# Patient Record
Sex: FEMALE | Race: WHITE | NOT HISPANIC OR LATINO
[De-identification: names, ages, dates, MRNs, and addresses within clinical notes are randomized per-mention and may not be internally consistent; named-entity substitution may affect disease eponyms.]

---

## 2022-06-28 PROBLEM — Z00.00 ENCOUNTER FOR PREVENTIVE HEALTH EXAMINATION: Status: ACTIVE | Noted: 2022-06-28

## 2022-07-11 ENCOUNTER — APPOINTMENT (OUTPATIENT)
Dept: ANTEPARTUM | Facility: CLINIC | Age: 32
End: 2022-07-11

## 2022-07-11 ENCOUNTER — ASOB RESULT (OUTPATIENT)
Age: 32
End: 2022-07-11

## 2022-07-11 PROCEDURE — 76813 OB US NUCHAL MEAS 1 GEST: CPT

## 2022-07-11 PROCEDURE — 76801 OB US < 14 WKS SINGLE FETUS: CPT | Mod: 59

## 2022-08-09 ENCOUNTER — ASOB RESULT (OUTPATIENT)
Age: 32
End: 2022-08-09

## 2022-08-09 ENCOUNTER — APPOINTMENT (OUTPATIENT)
Dept: ANTEPARTUM | Facility: CLINIC | Age: 32
End: 2022-08-09

## 2022-08-09 PROCEDURE — 76805 OB US >/= 14 WKS SNGL FETUS: CPT

## 2022-09-13 ENCOUNTER — APPOINTMENT (OUTPATIENT)
Dept: ANTEPARTUM | Facility: CLINIC | Age: 32
End: 2022-09-13

## 2022-09-13 ENCOUNTER — ASOB RESULT (OUTPATIENT)
Age: 32
End: 2022-09-13

## 2022-09-13 PROCEDURE — 76816 OB US FOLLOW-UP PER FETUS: CPT

## 2022-11-15 ENCOUNTER — ASOB RESULT (OUTPATIENT)
Age: 32
End: 2022-11-15

## 2022-11-15 ENCOUNTER — APPOINTMENT (OUTPATIENT)
Dept: ANTEPARTUM | Facility: CLINIC | Age: 32
End: 2022-11-15

## 2022-11-15 PROCEDURE — 76816 OB US FOLLOW-UP PER FETUS: CPT | Mod: 59

## 2022-11-15 PROCEDURE — 76819 FETAL BIOPHYS PROFIL W/O NST: CPT

## 2022-12-27 ENCOUNTER — APPOINTMENT (OUTPATIENT)
Dept: ANTEPARTUM | Facility: CLINIC | Age: 32
End: 2022-12-27
Payer: COMMERCIAL

## 2022-12-27 ENCOUNTER — ASOB RESULT (OUTPATIENT)
Age: 32
End: 2022-12-27

## 2022-12-27 PROCEDURE — 76820 UMBILICAL ARTERY ECHO: CPT

## 2022-12-27 PROCEDURE — 76816 OB US FOLLOW-UP PER FETUS: CPT

## 2022-12-27 PROCEDURE — 76818 FETAL BIOPHYS PROFILE W/NST: CPT

## 2023-01-10 ENCOUNTER — ASOB RESULT (OUTPATIENT)
Age: 33
End: 2023-01-10

## 2023-01-10 ENCOUNTER — APPOINTMENT (OUTPATIENT)
Dept: ANTEPARTUM | Facility: CLINIC | Age: 33
End: 2023-01-10
Payer: COMMERCIAL

## 2023-01-10 PROCEDURE — 76819 FETAL BIOPHYS PROFIL W/O NST: CPT

## 2023-01-10 PROCEDURE — 76816 OB US FOLLOW-UP PER FETUS: CPT

## 2023-01-18 ENCOUNTER — APPOINTMENT (OUTPATIENT)
Dept: ANTEPARTUM | Facility: CLINIC | Age: 33
End: 2023-01-18
Payer: COMMERCIAL

## 2023-01-18 ENCOUNTER — ASOB RESULT (OUTPATIENT)
Age: 33
End: 2023-01-18

## 2023-01-18 PROCEDURE — 76818 FETAL BIOPHYS PROFILE W/NST: CPT

## 2023-01-22 ENCOUNTER — INPATIENT (INPATIENT)
Facility: HOSPITAL | Age: 33
LOS: 2 days | Discharge: HOME | End: 2023-01-25
Attending: STUDENT IN AN ORGANIZED HEALTH CARE EDUCATION/TRAINING PROGRAM | Admitting: STUDENT IN AN ORGANIZED HEALTH CARE EDUCATION/TRAINING PROGRAM
Payer: COMMERCIAL

## 2023-01-22 VITALS — WEIGHT: 130.95 LBS | HEIGHT: 60 IN

## 2023-01-22 LAB
BASOPHILS # BLD AUTO: 0.02 K/UL — SIGNIFICANT CHANGE UP (ref 0–0.2)
BASOPHILS NFR BLD AUTO: 0.3 % — SIGNIFICANT CHANGE UP (ref 0–2)
EOSINOPHIL # BLD AUTO: 0.01 K/UL — SIGNIFICANT CHANGE UP (ref 0–0.5)
EOSINOPHIL NFR BLD AUTO: 0.1 % — SIGNIFICANT CHANGE UP (ref 0–6)
HCT VFR BLD CALC: 37.8 % — SIGNIFICANT CHANGE UP (ref 34.5–45)
HGB BLD-MCNC: 13.2 G/DL — SIGNIFICANT CHANGE UP (ref 11.5–15.5)
IMM GRANULOCYTES NFR BLD AUTO: 1 % — HIGH (ref 0–0.9)
LYMPHOCYTES # BLD AUTO: 1.36 K/UL — SIGNIFICANT CHANGE UP (ref 1–3.3)
LYMPHOCYTES # BLD AUTO: 19.9 % — SIGNIFICANT CHANGE UP (ref 13–44)
MCHC RBC-ENTMCNC: 33.6 PG — SIGNIFICANT CHANGE UP (ref 27–34)
MCHC RBC-ENTMCNC: 34.9 GM/DL — SIGNIFICANT CHANGE UP (ref 32–36)
MCV RBC AUTO: 96.2 FL — SIGNIFICANT CHANGE UP (ref 80–100)
MONOCYTES # BLD AUTO: 0.54 K/UL — SIGNIFICANT CHANGE UP (ref 0–0.9)
MONOCYTES NFR BLD AUTO: 7.9 % — SIGNIFICANT CHANGE UP (ref 2–14)
NEUTROPHILS # BLD AUTO: 4.84 K/UL — SIGNIFICANT CHANGE UP (ref 1.8–7.4)
NEUTROPHILS NFR BLD AUTO: 70.8 % — SIGNIFICANT CHANGE UP (ref 43–77)
NRBC # BLD: 0 /100 WBCS — SIGNIFICANT CHANGE UP (ref 0–0)
PLATELET # BLD AUTO: 166 K/UL — SIGNIFICANT CHANGE UP (ref 150–400)
RBC # BLD: 3.93 M/UL — SIGNIFICANT CHANGE UP (ref 3.8–5.2)
RBC # FLD: 13.8 % — SIGNIFICANT CHANGE UP (ref 10.3–14.5)
WBC # BLD: 6.84 K/UL — SIGNIFICANT CHANGE UP (ref 3.8–10.5)
WBC # FLD AUTO: 6.84 K/UL — SIGNIFICANT CHANGE UP (ref 3.8–10.5)

## 2023-01-22 RX ORDER — SODIUM CHLORIDE 9 MG/ML
1000 INJECTION, SOLUTION INTRAVENOUS
Refills: 0 | Status: DISCONTINUED | OUTPATIENT
Start: 2023-01-22 | End: 2023-01-23

## 2023-01-22 RX ORDER — CITRIC ACID/SODIUM CITRATE 300-500 MG
15 SOLUTION, ORAL ORAL EVERY 6 HOURS
Refills: 0 | Status: DISCONTINUED | OUTPATIENT
Start: 2023-01-22 | End: 2023-01-23

## 2023-01-22 RX ORDER — OXYTOCIN 10 UNIT/ML
333.33 VIAL (ML) INJECTION
Qty: 20 | Refills: 0 | Status: DISCONTINUED | OUTPATIENT
Start: 2023-01-22 | End: 2023-01-23

## 2023-01-22 RX ORDER — CHLORHEXIDINE GLUCONATE 213 G/1000ML
1 SOLUTION TOPICAL ONCE
Refills: 0 | Status: DISCONTINUED | OUTPATIENT
Start: 2023-01-22 | End: 2023-01-23

## 2023-01-22 RX ADMIN — SODIUM CHLORIDE 125 MILLILITER(S): 9 INJECTION, SOLUTION INTRAVENOUS at 23:52

## 2023-01-22 NOTE — PATIENT PROFILE OB - FALL HARM RISK - UNIVERSAL INTERVENTIONS
Bed in lowest position, wheels locked, appropriate side rails in place/Call bell, personal items and telephone in reach/Instruct patient to call for assistance before getting out of bed or chair/Non-slip footwear when patient is out of bed/[place] to call system/Physically safe environment - no spills, clutter or unnecessary equipment/Purposeful Proactive Rounding/Room/bathroom lighting operational, light cord in reach

## 2023-01-23 LAB
BLD GP AB SCN SERPL QL: NEGATIVE — SIGNIFICANT CHANGE UP
RH IG SCN BLD-IMP: POSITIVE — SIGNIFICANT CHANGE UP
RH IG SCN BLD-IMP: POSITIVE — SIGNIFICANT CHANGE UP

## 2023-01-23 PROCEDURE — 88307 TISSUE EXAM BY PATHOLOGIST: CPT | Mod: 26

## 2023-01-23 RX ORDER — HYDROCORTISONE 1 %
1 OINTMENT (GRAM) TOPICAL EVERY 6 HOURS
Refills: 0 | Status: DISCONTINUED | OUTPATIENT
Start: 2023-01-23 | End: 2023-01-25

## 2023-01-23 RX ORDER — LANOLIN
1 OINTMENT (GRAM) TOPICAL EVERY 6 HOURS
Refills: 0 | Status: DISCONTINUED | OUTPATIENT
Start: 2023-01-23 | End: 2023-01-25

## 2023-01-23 RX ORDER — DIBUCAINE 1 %
1 OINTMENT (GRAM) RECTAL EVERY 6 HOURS
Refills: 0 | Status: DISCONTINUED | OUTPATIENT
Start: 2023-01-23 | End: 2023-01-25

## 2023-01-23 RX ORDER — OXYCODONE HYDROCHLORIDE 5 MG/1
5 TABLET ORAL
Refills: 0 | Status: DISCONTINUED | OUTPATIENT
Start: 2023-01-23 | End: 2023-01-25

## 2023-01-23 RX ORDER — OXYTOCIN 10 UNIT/ML
41.67 VIAL (ML) INJECTION
Qty: 20 | Refills: 0 | Status: DISCONTINUED | OUTPATIENT
Start: 2023-01-23 | End: 2023-01-25

## 2023-01-23 RX ORDER — MAGNESIUM HYDROXIDE 400 MG/1
30 TABLET, CHEWABLE ORAL
Refills: 0 | Status: DISCONTINUED | OUTPATIENT
Start: 2023-01-23 | End: 2023-01-25

## 2023-01-23 RX ORDER — IBUPROFEN 200 MG
600 TABLET ORAL EVERY 6 HOURS
Refills: 0 | Status: COMPLETED | OUTPATIENT
Start: 2023-01-23 | End: 2023-12-22

## 2023-01-23 RX ORDER — ACETAMINOPHEN 500 MG
975 TABLET ORAL
Refills: 0 | Status: DISCONTINUED | OUTPATIENT
Start: 2023-01-23 | End: 2023-01-25

## 2023-01-23 RX ORDER — OXYCODONE HYDROCHLORIDE 5 MG/1
5 TABLET ORAL ONCE
Refills: 0 | Status: DISCONTINUED | OUTPATIENT
Start: 2023-01-23 | End: 2023-01-25

## 2023-01-23 RX ORDER — PRAMOXINE HYDROCHLORIDE 150 MG/15G
1 AEROSOL, FOAM RECTAL EVERY 4 HOURS
Refills: 0 | Status: DISCONTINUED | OUTPATIENT
Start: 2023-01-23 | End: 2023-01-25

## 2023-01-23 RX ORDER — TETANUS TOXOID, REDUCED DIPHTHERIA TOXOID AND ACELLULAR PERTUSSIS VACCINE, ADSORBED 5; 2.5; 8; 8; 2.5 [IU]/.5ML; [IU]/.5ML; UG/.5ML; UG/.5ML; UG/.5ML
0.5 SUSPENSION INTRAMUSCULAR ONCE
Refills: 0 | Status: DISCONTINUED | OUTPATIENT
Start: 2023-01-23 | End: 2023-01-25

## 2023-01-23 RX ORDER — OXYTOCIN 10 UNIT/ML
2 VIAL (ML) INJECTION
Qty: 30 | Refills: 0 | Status: DISCONTINUED | OUTPATIENT
Start: 2023-01-23 | End: 2023-01-25

## 2023-01-23 RX ORDER — SODIUM CHLORIDE 9 MG/ML
3 INJECTION INTRAMUSCULAR; INTRAVENOUS; SUBCUTANEOUS EVERY 8 HOURS
Refills: 0 | Status: DISCONTINUED | OUTPATIENT
Start: 2023-01-23 | End: 2023-01-25

## 2023-01-23 RX ORDER — FENTANYL/BUPIVACAINE/NS/PF 2MCG/ML-.1
250 PLASTIC BAG, INJECTION (ML) INJECTION
Refills: 0 | Status: DISCONTINUED | OUTPATIENT
Start: 2023-01-23 | End: 2023-01-23

## 2023-01-23 RX ORDER — AER TRAVELER 0.5 G/1
1 SOLUTION RECTAL; TOPICAL EVERY 4 HOURS
Refills: 0 | Status: DISCONTINUED | OUTPATIENT
Start: 2023-01-23 | End: 2023-01-25

## 2023-01-23 RX ORDER — DIPHENHYDRAMINE HCL 50 MG
25 CAPSULE ORAL EVERY 6 HOURS
Refills: 0 | Status: DISCONTINUED | OUTPATIENT
Start: 2023-01-23 | End: 2023-01-25

## 2023-01-23 RX ORDER — BENZOCAINE 10 %
1 GEL (GRAM) MUCOUS MEMBRANE EVERY 6 HOURS
Refills: 0 | Status: DISCONTINUED | OUTPATIENT
Start: 2023-01-23 | End: 2023-01-25

## 2023-01-23 RX ORDER — KETOROLAC TROMETHAMINE 30 MG/ML
30 SYRINGE (ML) INJECTION ONCE
Refills: 0 | Status: DISCONTINUED | OUTPATIENT
Start: 2023-01-23 | End: 2023-01-23

## 2023-01-23 RX ORDER — SIMETHICONE 80 MG/1
80 TABLET, CHEWABLE ORAL EVERY 4 HOURS
Refills: 0 | Status: DISCONTINUED | OUTPATIENT
Start: 2023-01-23 | End: 2023-01-25

## 2023-01-23 RX ADMIN — Medication 2 MILLIUNIT(S)/MIN: at 02:15

## 2023-01-23 RX ADMIN — SODIUM CHLORIDE 125 MILLILITER(S): 9 INJECTION, SOLUTION INTRAVENOUS at 08:09

## 2023-01-23 RX ADMIN — Medication 30 MILLIGRAM(S): at 19:58

## 2023-01-23 RX ADMIN — Medication 975 MILLIGRAM(S): at 22:42

## 2023-01-23 RX ADMIN — Medication 250 MILLILITER(S): at 06:25

## 2023-01-23 RX ADMIN — Medication 125 MILLIUNIT(S)/MIN: at 19:58

## 2023-01-23 RX ADMIN — SODIUM CHLORIDE 125 MILLILITER(S): 9 INJECTION, SOLUTION INTRAVENOUS at 06:33

## 2023-01-23 RX ADMIN — SODIUM CHLORIDE 3 MILLILITER(S): 9 INJECTION INTRAMUSCULAR; INTRAVENOUS; SUBCUTANEOUS at 22:42

## 2023-01-23 RX ADMIN — Medication 975 MILLIGRAM(S): at 22:39

## 2023-01-23 RX ADMIN — Medication 0.2 MILLIGRAM(S): at 19:22

## 2023-01-23 NOTE — LACTATION INITIAL EVALUATION - BABY A: WEIGHT IN OUNCES (FROM GRAMS), DELIVERY
"/53   Pulse 87   Temp 97.9  F (36.6  C) (Oral)   Resp 16   Ht 1.626 m (5' 4\")   Wt 108.1 kg (238 lb 6.4 oz)   SpO2 95%   BMI 40.92 kg/m    On RA. LS inspiratory wheezes audible throughout, BS active. Patient does have SOB with exertion. Denies chest pain, N/T, N/V, and/or dizziness.  A&Ox4, tremors, strengths intact.  Up assist x1 with walker and gait belt, can forget to call.   Voiding without difficulty, LBM 7/1/22.  Regular diet tolerating well, 2000 mL fluid restriction.  Skin is intact ex. bruising on BUE from needle pokes.  L forearm PIV SL.  CIWA-2, Phos and Mag WDL rechecks ordered for AM.  Able to make needs known, call light within reach, continue plan of care, transfer to [facility] when bed available.  " 10

## 2023-01-23 NOTE — LACTATION INITIAL EVALUATION - NS LACT CON REASON FOR REQ
Dyad seen at 4hrs post birth. Mom states that infant latched after birth and sucking sustained about 20minutes, Demonstrate hand expressing colostrum, bilateral copious expressible colostrum present. Place infant on Rt breast with football hold. Infant latched with widely open flanged mouth and sucking sustained without nipple pain. Discuss how to maintain and increase breast milk supply and benefits of exclusively breastfeeding. Discuss consult with primary RN/primaparous mom

## 2023-01-24 ENCOUNTER — TRANSCRIPTION ENCOUNTER (OUTPATIENT)
Age: 33
End: 2023-01-24

## 2023-01-24 LAB
COVID-19 SPIKE DOMAIN AB INTERP: POSITIVE
COVID-19 SPIKE DOMAIN ANTIBODY RESULT: >250 U/ML — HIGH
HCT VFR BLD CALC: 30 % — LOW (ref 34.5–45)
HGB BLD-MCNC: 10.4 G/DL — LOW (ref 11.5–15.5)
MCHC RBC-ENTMCNC: 33.4 PG — SIGNIFICANT CHANGE UP (ref 27–34)
MCHC RBC-ENTMCNC: 34.7 GM/DL — SIGNIFICANT CHANGE UP (ref 32–36)
MCV RBC AUTO: 96.5 FL — SIGNIFICANT CHANGE UP (ref 80–100)
NRBC # BLD: 0 /100 WBCS — SIGNIFICANT CHANGE UP (ref 0–0)
PLATELET # BLD AUTO: 158 K/UL — SIGNIFICANT CHANGE UP (ref 150–400)
RBC # BLD: 3.11 M/UL — LOW (ref 3.8–5.2)
RBC # FLD: 14.1 % — SIGNIFICANT CHANGE UP (ref 10.3–14.5)
SARS-COV-2 IGG+IGM SERPL QL IA: >250 U/ML — HIGH
SARS-COV-2 IGG+IGM SERPL QL IA: POSITIVE
T PALLIDUM AB TITR SER: NEGATIVE — SIGNIFICANT CHANGE UP
WBC # BLD: 16.26 K/UL — HIGH (ref 3.8–10.5)
WBC # FLD AUTO: 16.26 K/UL — HIGH (ref 3.8–10.5)

## 2023-01-24 RX ORDER — IBUPROFEN 200 MG
600 TABLET ORAL EVERY 6 HOURS
Refills: 0 | Status: DISCONTINUED | OUTPATIENT
Start: 2023-01-24 | End: 2023-01-25

## 2023-01-24 RX ORDER — ACETAMINOPHEN 500 MG
3 TABLET ORAL
Qty: 0 | Refills: 0 | DISCHARGE
Start: 2023-01-24

## 2023-01-24 RX ORDER — IBUPROFEN 200 MG
1 TABLET ORAL
Qty: 0 | Refills: 0 | DISCHARGE
Start: 2023-01-24

## 2023-01-24 RX ORDER — BENZOCAINE 10 %
1 GEL (GRAM) MUCOUS MEMBRANE
Qty: 0 | Refills: 0 | DISCHARGE
Start: 2023-01-24

## 2023-01-24 RX ORDER — POLYETHYLENE GLYCOL 3350 17 G/17G
17 POWDER, FOR SOLUTION ORAL ONCE
Refills: 0 | Status: COMPLETED | OUTPATIENT
Start: 2023-01-24 | End: 2023-01-24

## 2023-01-24 RX ADMIN — POLYETHYLENE GLYCOL 3350 17 GRAM(S): 17 POWDER, FOR SOLUTION ORAL at 12:06

## 2023-01-24 RX ADMIN — Medication 975 MILLIGRAM(S): at 04:03

## 2023-01-24 RX ADMIN — SODIUM CHLORIDE 3 MILLILITER(S): 9 INJECTION INTRAMUSCULAR; INTRAVENOUS; SUBCUTANEOUS at 07:45

## 2023-01-24 RX ADMIN — Medication 975 MILLIGRAM(S): at 14:39

## 2023-01-24 RX ADMIN — Medication 975 MILLIGRAM(S): at 21:57

## 2023-01-24 RX ADMIN — Medication 975 MILLIGRAM(S): at 09:09

## 2023-01-24 RX ADMIN — SODIUM CHLORIDE 3 MILLILITER(S): 9 INJECTION INTRAMUSCULAR; INTRAVENOUS; SUBCUTANEOUS at 14:50

## 2023-01-24 RX ADMIN — Medication 600 MILLIGRAM(S): at 11:35

## 2023-01-24 RX ADMIN — Medication 1 APPLICATION(S): at 12:06

## 2023-01-24 RX ADMIN — Medication 600 MILLIGRAM(S): at 18:30

## 2023-01-24 RX ADMIN — Medication 600 MILLIGRAM(S): at 06:45

## 2023-01-24 RX ADMIN — Medication 600 MILLIGRAM(S): at 06:44

## 2023-01-24 RX ADMIN — Medication 975 MILLIGRAM(S): at 22:16

## 2023-01-24 RX ADMIN — SODIUM CHLORIDE 3 MILLILITER(S): 9 INJECTION INTRAMUSCULAR; INTRAVENOUS; SUBCUTANEOUS at 22:16

## 2023-01-24 NOTE — DISCHARGE NOTE OB - CARE PROVIDER_API CALL
Patricia Muro (DO)  Obstetrics and Gynecology  [address] [address]  Phone: [phone]  Fax: [phone]  Follow Up Time: 2 months

## 2023-01-24 NOTE — DISCHARGE NOTE OB - PATIENT PORTAL LINK FT
You can access the FollowMyHealth Patient Portal offered by [facility] by registering at the following website: [email or web address]. By joining [employer]’s FollowMyHealth portal, you will also be able to view your health information using other applications (apps) compatible with our system.

## 2023-01-24 NOTE — PROGRESS NOTE ADULT - SUBJECTIVE AND OBJECTIVE BOX
Patient evaluated at bedside this morning, resting comfortably in bed, no acute events overnight.  She reports pain is well controlled with tylenol and motrin.  Reports decrease in amount of vaginal bleeding.  She has been ambulating without assistance, voiding spontaneously.  Tolerating food well, without nausea/vomit.      Physical Exam:  T(C): 36.8 (01-24-23 @ 06:02), Max: 36.8 (01-23-23 @ 21:59)  HR: 60 (01-24-23 @ 06:02) (60 - 89)  BP: 93/55 (01-24-23 @ 06:02) (93/55 - 130/86)  RR: 18 (01-24-23 @ 06:02) (17 - 18)  SpO2: 96% (01-24-23 @ 06:02) (96% - 100%)    GA: NAD, patient is alert and oriented  Resp: No increased work of breathing, breathing comfortably on RA  Abd: Soft, nontender, nondistended, no rebound or guarding, uterus firm.  Extremities: no swelling or calf tenderness                          13.2   6.84  )-----------( 166      ( 22 Jan 2023 23:36 )             37.8           acetaminophen     Tablet .. 975 milliGRAM(s) Oral <User Schedule>  benzocaine 20%/menthol 0.5% Spray 1 Spray(s) Topical every 6 hours PRN  dibucaine 1% Ointment 1 Application(s) Topical every 6 hours PRN  diphenhydrAMINE 25 milliGRAM(s) Oral every 6 hours PRN  diphtheria/tetanus/pertussis (acellular) Vaccine (Adacel) 0.5 milliLiter(s) IntraMuscular once  fentanyl (2 MICROgram(s)/mL) + bupivacaine 0.0625%  in 0.9% Sodium Chloride PCEA 250 milliLiter(s) Epidural PCA Continuous  hydrocortisone 1% Cream 1 Application(s) Topical every 6 hours PRN  ibuprofen  Tablet. 600 milliGRAM(s) Oral every 6 hours  lanolin Ointment 1 Application(s) Topical every 6 hours PRN  magnesium hydroxide Suspension 30 milliLiter(s) Oral two times a day PRN  oxyCODONE    IR 5 milliGRAM(s) Oral once PRN  oxyCODONE    IR 5 milliGRAM(s) Oral every 3 hours PRN  oxytocin Infusion 41.667 milliUNIT(s)/Min IV Continuous <Continuous>  oxytocin Infusion. 2 milliUNIT(s)/Min IV Continuous <Continuous>  pramoxine 1%/zinc 5% Cream 1 Application(s) Topical every 4 hours PRN  prenatal multivitamin 1 Tablet(s) Oral daily  simethicone 80 milliGRAM(s) Chew every 4 hours PRN  sodium chloride 0.9% lock flush 3 milliLiter(s) IV Push every 8 hours  witch hazel Pads 1 Application(s) Topical every 4 hours PRN

## 2023-01-24 NOTE — DISCHARGE NOTE OB - NS MD DC FALL RISK RISK
For information on Fall & Injury Prevention, visit: [email or web address] OR  [email or web address] OR  https://www.cdc.gov/steadi/patient.html

## 2023-01-24 NOTE — PROGRESS NOTE ADULT - ASSESSMENT
A/P 32y s/p , PPD#1, stable, meeting postpartum milestones   - Pain: well controlled on tylenol/motrin  - GI: Tolerating regular diet  - : urinating without difficulty/pain  - DVT prophylaxis: ambulating frequently  - Dispo: PPD 2, unless otherwise specified

## 2023-01-25 VITALS
HEART RATE: 85 BPM | OXYGEN SATURATION: 97 % | RESPIRATION RATE: 16 BRPM | DIASTOLIC BLOOD PRESSURE: 75 MMHG | SYSTOLIC BLOOD PRESSURE: 104 MMHG | TEMPERATURE: 98 F

## 2023-01-25 PROCEDURE — 86900 BLOOD TYPING SEROLOGIC ABO: CPT

## 2023-01-25 PROCEDURE — 86901 BLOOD TYPING SEROLOGIC RH(D): CPT

## 2023-01-25 PROCEDURE — 86850 RBC ANTIBODY SCREEN: CPT

## 2023-01-25 PROCEDURE — 59050 FETAL MONITOR W/REPORT: CPT

## 2023-01-25 PROCEDURE — 86769 SARS-COV-2 COVID-19 ANTIBODY: CPT

## 2023-01-25 PROCEDURE — 88307 TISSUE EXAM BY PATHOLOGIST: CPT

## 2023-01-25 PROCEDURE — 85025 COMPLETE CBC W/AUTO DIFF WBC: CPT

## 2023-01-25 PROCEDURE — 36415 COLL VENOUS BLD VENIPUNCTURE: CPT

## 2023-01-25 PROCEDURE — 85027 COMPLETE CBC AUTOMATED: CPT

## 2023-01-25 PROCEDURE — 86780 TREPONEMA PALLIDUM: CPT

## 2023-01-25 RX ADMIN — Medication 600 MILLIGRAM(S): at 00:32

## 2023-01-25 RX ADMIN — SODIUM CHLORIDE 3 MILLILITER(S): 9 INJECTION INTRAMUSCULAR; INTRAVENOUS; SUBCUTANEOUS at 07:03

## 2023-01-25 RX ADMIN — Medication 600 MILLIGRAM(S): at 00:37

## 2023-01-25 RX ADMIN — Medication 975 MILLIGRAM(S): at 04:07

## 2023-01-25 RX ADMIN — Medication 975 MILLIGRAM(S): at 03:40

## 2023-01-25 RX ADMIN — Medication 600 MILLIGRAM(S): at 07:02

## 2023-01-25 RX ADMIN — Medication 975 MILLIGRAM(S): at 09:38

## 2023-01-25 RX ADMIN — Medication 600 MILLIGRAM(S): at 06:55

## 2023-01-25 RX ADMIN — Medication 600 MILLIGRAM(S): at 11:26

## 2023-01-25 NOTE — PROGRESS NOTE ADULT - SUBJECTIVE AND OBJECTIVE BOX
Patient evaluated at bedside this morning, resting comfortable in bed, no acute events overnight.  She reports pain is well controlled with tylenol and motrin.  She denies headache, dizziness, chest pain, palpitations, shortness of breath, nausea, vomiting, fever, chills, heavy vaginal bleeding. She has been ambulating without assistance, voiding spontaneously.  Tolerating food well, without nausea/vomit.      Physical Exam:  T(C): 36.8 (01-24-23 @ 22:18), Max: 36.8 (01-24-23 @ 22:18)  HR: 59 (01-24-23 @ 22:18) (59 - 59)  BP: 110/70 (01-24-23 @ 22:18) (110/70 - 110/70)  RR: 17 (01-24-23 @ 22:18) (17 - 17)  SpO2: 98% (01-24-23 @ 22:18) (98% - 98%)    GA: NAD, A&O x 3  Pulm: no increased work of breathing  Abd: soft, nontender, nondistended, no rebound or guarding, uterus firm.  Extremities: no swelling or calf tenderness                          10.4   16.26 )-----------( 158      ( 24 Jan 2023 12:07 )             30.0           acetaminophen     Tablet .. 975 milliGRAM(s) Oral <User Schedule>  benzocaine 20%/menthol 0.5% Spray 1 Spray(s) Topical every 6 hours PRN  dibucaine 1% Ointment 1 Application(s) Topical every 6 hours PRN  diphenhydrAMINE 25 milliGRAM(s) Oral every 6 hours PRN  diphtheria/tetanus/pertussis (acellular) Vaccine (Adacel) 0.5 milliLiter(s) IntraMuscular once  fentanyl (2 MICROgram(s)/mL) + bupivacaine 0.0625%  in 0.9% Sodium Chloride PCEA 250 milliLiter(s) Epidural PCA Continuous  hydrocortisone 1% Cream 1 Application(s) Topical every 6 hours PRN  ibuprofen  Tablet. 600 milliGRAM(s) Oral every 6 hours  lanolin Ointment 1 Application(s) Topical every 6 hours PRN  magnesium hydroxide Suspension 30 milliLiter(s) Oral two times a day PRN  oxyCODONE    IR 5 milliGRAM(s) Oral every 3 hours PRN  oxyCODONE    IR 5 milliGRAM(s) Oral once PRN  oxytocin Infusion 41.667 milliUNIT(s)/Min IV Continuous <Continuous>  oxytocin Infusion. 2 milliUNIT(s)/Min IV Continuous <Continuous>  pramoxine 1%/zinc 5% Cream 1 Application(s) Topical every 4 hours PRN  prenatal multivitamin 1 Tablet(s) Oral daily  simethicone 80 milliGRAM(s) Chew every 4 hours PRN  sodium chloride 0.9% lock flush 3 milliLiter(s) IV Push every 8 hours  witch hazel Pads 1 Application(s) Topical every 4 hours PRN

## 2023-01-25 NOTE — PROGRESS NOTE ADULT - ATTENDING COMMENTS
Pt is a 33yo  on PPD1 s/p  significant for shoulder dystocia. Patient hemodynamically stable and doing well  - Continue routine postpartum care  - Encouraged ambulation  - Reviewed perineal care  - CBC wnl today  - Dispo: Plan for Discharge home PPD2
Pt is a 33yo  on PPD2 s/p  significant for shoulder dystocia. Patient meeting all postpartum milestones and would like discharge home today  - Reviewed warning signs and discharge teaching  - Encouraged ambulation at home  - Follow up in office 6 weeks for postpartum check

## 2023-01-25 NOTE — PROGRESS NOTE ADULT - ASSESSMENT
A/P 32y s/p , PPD#1 , stable, meeting postpartum milestones   - Pain: well controlled on tylenol/motrin  - GI: Tolerating regular diet  - : urinating without difficulty/pain  - DVT prophylaxis: ambulating frequently  - Dispo: PPD 2, unless otherwise specified     A/P 32y s/p , PPD#2 , stable, meeting postpartum milestones   - Pain: well controlled on tylenol/motrin  - GI: Tolerating regular diet  - : urinating without difficulty/pain  - DVT prophylaxis: ambulating frequently  - Dispo: PPD 2, unless otherwise specified

## 2023-01-26 LAB — SURGICAL PATHOLOGY STUDY: SIGNIFICANT CHANGE UP

## 2023-01-30 DIAGNOSIS — O48.0 POST-TERM PREGNANCY: ICD-10-CM

## 2023-01-30 DIAGNOSIS — Z3A.40 40 WEEKS GESTATION OF PREGNANCY: ICD-10-CM

## 2023-01-30 DIAGNOSIS — Z28.09 IMMUNIZATION NOT CARRIED OUT BECAUSE OF OTHER CONTRAINDICATION: ICD-10-CM

## 2023-02-05 ENCOUNTER — TRANSCRIPTION ENCOUNTER (OUTPATIENT)
Age: 33
End: 2023-02-05

## 2024-08-09 ENCOUNTER — TRANSCRIPTION ENCOUNTER (OUTPATIENT)
Age: 34
End: 2024-08-09

## 2024-08-12 ENCOUNTER — TRANSCRIPTION ENCOUNTER (OUTPATIENT)
Age: 34
End: 2024-08-12

## 2024-09-11 ENCOUNTER — INPATIENT (INPATIENT)
Facility: HOSPITAL | Age: 34
LOS: 1 days | Discharge: HOME | DRG: 769 | End: 2024-09-13
Attending: OBSTETRICS & GYNECOLOGY | Admitting: OBSTETRICS & GYNECOLOGY
Payer: COMMERCIAL

## 2024-09-11 ENCOUNTER — TRANSCRIPTION ENCOUNTER (OUTPATIENT)
Age: 34
End: 2024-09-11

## 2024-09-11 VITALS
HEIGHT: 60 IN | RESPIRATION RATE: 17 BRPM | WEIGHT: 115.08 LBS | TEMPERATURE: 98 F | HEART RATE: 99 BPM | SYSTOLIC BLOOD PRESSURE: 110 MMHG | OXYGEN SATURATION: 100 % | DIASTOLIC BLOOD PRESSURE: 78 MMHG

## 2024-09-11 LAB
ALBUMIN SERPL ELPH-MCNC: 3.6 G/DL — SIGNIFICANT CHANGE UP (ref 3.3–5)
ALP SERPL-CCNC: 105 U/L — SIGNIFICANT CHANGE UP (ref 40–120)
ALT FLD-CCNC: 6 U/L — LOW (ref 10–45)
ANION GAP SERPL CALC-SCNC: 11 MMOL/L — SIGNIFICANT CHANGE UP (ref 5–17)
APPEARANCE UR: ABNORMAL
APTT BLD: 27.8 SEC — SIGNIFICANT CHANGE UP (ref 24.5–35.6)
AST SERPL-CCNC: 15 U/L — SIGNIFICANT CHANGE UP (ref 10–40)
BACTERIA # UR AUTO: ABNORMAL /HPF
BASOPHILS # BLD AUTO: 0.01 K/UL — SIGNIFICANT CHANGE UP (ref 0–0.2)
BASOPHILS NFR BLD AUTO: 0.2 % — SIGNIFICANT CHANGE UP (ref 0–2)
BILIRUB DIRECT SERPL-MCNC: <0.2 MG/DL — SIGNIFICANT CHANGE UP (ref 0–0.3)
BILIRUB INDIRECT FLD-MCNC: SIGNIFICANT CHANGE UP (ref 0.2–1)
BILIRUB SERPL-MCNC: 0.4 MG/DL — SIGNIFICANT CHANGE UP (ref 0.2–1.2)
BILIRUB UR-MCNC: ABNORMAL
BLD GP AB SCN SERPL QL: NEGATIVE — SIGNIFICANT CHANGE UP
BUN SERPL-MCNC: 15 MG/DL — SIGNIFICANT CHANGE UP (ref 7–23)
CALCIUM SERPL-MCNC: 9 MG/DL — SIGNIFICANT CHANGE UP (ref 8.4–10.5)
CAST: 0 /LPF — SIGNIFICANT CHANGE UP (ref 0–4)
CHLORIDE SERPL-SCNC: 104 MMOL/L — SIGNIFICANT CHANGE UP (ref 96–108)
CO2 SERPL-SCNC: 22 MMOL/L — SIGNIFICANT CHANGE UP (ref 22–31)
COLOR SPEC: ABNORMAL
CREAT SERPL-MCNC: 0.85 MG/DL — SIGNIFICANT CHANGE UP (ref 0.5–1.3)
DIFF PNL FLD: ABNORMAL
EGFR: 93 ML/MIN/1.73M2 — SIGNIFICANT CHANGE UP
EOSINOPHIL # BLD AUTO: 0.01 K/UL — SIGNIFICANT CHANGE UP (ref 0–0.5)
EOSINOPHIL NFR BLD AUTO: 0.2 % — SIGNIFICANT CHANGE UP (ref 0–6)
FLUAV AG NPH QL: SIGNIFICANT CHANGE UP
FLUBV AG NPH QL: SIGNIFICANT CHANGE UP
GLUCOSE SERPL-MCNC: 99 MG/DL — SIGNIFICANT CHANGE UP (ref 70–99)
GLUCOSE UR QL: NEGATIVE MG/DL — SIGNIFICANT CHANGE UP
HCG SERPL-ACNC: 26 MIU/ML — HIGH
HCT VFR BLD CALC: 36.9 % — SIGNIFICANT CHANGE UP (ref 34.5–45)
HGB BLD-MCNC: 12.3 G/DL — SIGNIFICANT CHANGE UP (ref 11.5–15.5)
IMM GRANULOCYTES NFR BLD AUTO: 0.6 % — SIGNIFICANT CHANGE UP (ref 0–0.9)
INR BLD: 1.04 — SIGNIFICANT CHANGE UP (ref 0.85–1.18)
KETONES UR-MCNC: NEGATIVE MG/DL — SIGNIFICANT CHANGE UP
LEUKOCYTE ESTERASE UR-ACNC: ABNORMAL
LIDOCAIN IGE QN: 22 U/L — SIGNIFICANT CHANGE UP (ref 7–60)
LYMPHOCYTES # BLD AUTO: 0.64 K/UL — LOW (ref 1–3.3)
LYMPHOCYTES # BLD AUTO: 13.9 % — SIGNIFICANT CHANGE UP (ref 13–44)
MCHC RBC-ENTMCNC: 31.2 PG — SIGNIFICANT CHANGE UP (ref 27–34)
MCHC RBC-ENTMCNC: 33.3 GM/DL — SIGNIFICANT CHANGE UP (ref 32–36)
MCV RBC AUTO: 93.7 FL — SIGNIFICANT CHANGE UP (ref 80–100)
MONOCYTES # BLD AUTO: 0.27 K/UL — SIGNIFICANT CHANGE UP (ref 0–0.9)
MONOCYTES NFR BLD AUTO: 5.8 % — SIGNIFICANT CHANGE UP (ref 2–14)
NEUTROPHILS # BLD AUTO: 3.66 K/UL — SIGNIFICANT CHANGE UP (ref 1.8–7.4)
NEUTROPHILS NFR BLD AUTO: 79.3 % — HIGH (ref 43–77)
NITRITE UR-MCNC: POSITIVE
NRBC # BLD: 0 /100 WBCS — SIGNIFICANT CHANGE UP (ref 0–0)
PH UR: 7.5 — SIGNIFICANT CHANGE UP (ref 5–8)
PLATELET # BLD AUTO: 140 K/UL — LOW (ref 150–400)
POTASSIUM SERPL-MCNC: 3.9 MMOL/L — SIGNIFICANT CHANGE UP (ref 3.5–5.3)
POTASSIUM SERPL-SCNC: 3.9 MMOL/L — SIGNIFICANT CHANGE UP (ref 3.5–5.3)
PROT SERPL-MCNC: 7.1 G/DL — SIGNIFICANT CHANGE UP (ref 6–8.3)
PROT UR-MCNC: 100 MG/DL
PROTHROM AB SERPL-ACNC: 11.8 SEC — SIGNIFICANT CHANGE UP (ref 9.5–13)
RBC # BLD: 3.94 M/UL — SIGNIFICANT CHANGE UP (ref 3.8–5.2)
RBC # FLD: 13.1 % — SIGNIFICANT CHANGE UP (ref 10.3–14.5)
RBC CASTS # UR COMP ASSIST: >1900 /HPF — HIGH (ref 0–4)
RH IG SCN BLD-IMP: POSITIVE — SIGNIFICANT CHANGE UP
RSV RNA NPH QL NAA+NON-PROBE: SIGNIFICANT CHANGE UP
SARS-COV-2 RNA SPEC QL NAA+PROBE: SIGNIFICANT CHANGE UP
SODIUM SERPL-SCNC: 137 MMOL/L — SIGNIFICANT CHANGE UP (ref 135–145)
SP GR SPEC: 1.02 — SIGNIFICANT CHANGE UP (ref 1–1.03)
SQUAMOUS # UR AUTO: >36 /HPF — HIGH (ref 0–5)
UROBILINOGEN FLD QL: 0.2 MG/DL — SIGNIFICANT CHANGE UP (ref 0.2–1)
WBC # BLD: 4.62 K/UL — SIGNIFICANT CHANGE UP (ref 3.8–10.5)
WBC # FLD AUTO: 4.62 K/UL — SIGNIFICANT CHANGE UP (ref 3.8–10.5)
WBC UR QL: 170 /HPF — HIGH (ref 0–5)

## 2024-09-11 PROCEDURE — 76856 US EXAM PELVIC COMPLETE: CPT | Mod: 26

## 2024-09-11 PROCEDURE — 76830 TRANSVAGINAL US NON-OB: CPT | Mod: 26

## 2024-09-11 PROCEDURE — 99285 EMERGENCY DEPT VISIT HI MDM: CPT

## 2024-09-11 RX ORDER — ACETAMINOPHEN 325 MG/1
1000 TABLET ORAL ONCE
Refills: 0 | Status: COMPLETED | OUTPATIENT
Start: 2024-09-11 | End: 2024-09-11

## 2024-09-11 RX ORDER — GENTAMICIN 40 MG/ML
13600 INJECTION, SOLUTION INTRAMUSCULAR; INTRAVENOUS ONCE
Refills: 0 | Status: DISCONTINUED | OUTPATIENT
Start: 2024-09-11 | End: 2024-09-11

## 2024-09-11 RX ORDER — CLINDAMYCIN PHOSPHATE 150 MG/ML
900 VIAL (ML) INJECTION EVERY 8 HOURS
Refills: 0 | Status: DISCONTINUED | OUTPATIENT
Start: 2024-09-11 | End: 2024-09-12

## 2024-09-11 RX ORDER — GENTAMICIN 40 MG/ML
260 INJECTION, SOLUTION INTRAMUSCULAR; INTRAVENOUS ONCE
Refills: 0 | Status: DISCONTINUED | OUTPATIENT
Start: 2024-09-11 | End: 2024-09-12

## 2024-09-11 RX ADMIN — ACETAMINOPHEN 400 MILLIGRAM(S): 325 TABLET ORAL at 23:25

## 2024-09-11 RX ADMIN — Medication 100 MILLIGRAM(S): at 19:16

## 2024-09-11 RX ADMIN — Medication 125 MILLILITER(S): at 23:42

## 2024-09-11 NOTE — ED ADULT NURSE REASSESSMENT NOTE - NS ED NURSE REASSESS COMMENT FT1
Pt endorsed to [facility]. Pt awaiting OR. Pt aware of plan. RN attempting to get in contact with gyn regarding temp of 100.5F. Pt denies needs at this time. Equal chest rise and fall.

## 2024-09-11 NOTE — ED ADULT NURSE NOTE - NSFALLUNIVINTERV_ED_ALL_ED
Bed/Stretcher in lowest position, wheels locked, appropriate side rails in place/Call bell, personal items and telephone in reach/Instruct patient to call for assistance before getting out of bed/chair/stretcher/Non-slip footwear applied when patient is off stretcher/[place] to call system/Physically safe environment - no spills, clutter or unnecessary equipment/Purposeful proactive rounding/Room/bathroom lighting operational, light cord in reach

## 2024-09-11 NOTE — ED PROVIDER NOTE - NS ED ROS FT
Constitutional: +fever or chills  Eyes: No discharge or drainage  Ears, Nose, Mouth, Throat: No nasal discharge, no sore throat  Cardiovascular: No chest pain, no palpitations  Respiratory: No shortness of breath, no cough  Gastrointestinal: No nausea or vomiting, +abdominal pain, no diarrhea or constipation  Musculoskeletal: +joint pain, no swelling  Skin: No rashes or lesions  Neurological: No numbness, weakness, tingling, no headache

## 2024-09-11 NOTE — CONSULT NOTE ADULT - ASSESSMENT
A/P: 33y  POD32 from c/s for arrest of decent with infectious symptoms and new heavy VB   - Hemodynamically stable. H 12.3. Slow active bleeding from os. Continue to monitor  - R/o infection: patient remains afebrile with stable vitals and normal wbc. Low suspicion for post partum endometritis given no fundal tenderness. UA shows signs of UTI, UCx pending. Will reexamine patient to reevaluate for mastitis   - Vaginal bleeding: possible retained POCs, however would be unlikely this far post op. Awaiting TVUS. Discussed possibility of bleeding 2/2 to menses however patient endorses PCOS with amenorrhea x3 years. Will rediscuss after imaging returns.     D/w Dr. Toñito Weathers PA-C A/P: 33y  POD32 from c/s for arrest of decent with infectious symptoms and new heavy VB   - Hemodynamically stable. H 12.3. Slow active bleeding from os. Continue to monitor  - R/o infection: patient remains afebrile with stable vitals and normal wbc. Low suspicion for post partum endometritis given no fundal tenderness. UA shows signs of UTI, UCx pending. Will reexamine patient to reevaluate for mastitis   - Vaginal bleeding: possible retained POCs, however would be unlikely this far post op. Awaiting TVUS. Discussed possibility of bleeding 2/2 to menses however patient endorses PCOS with amenorrhea x3 years. Will rediscuss after imaging returns.     D/w Dr. Toñito Weathers PA-C    Addendum  at 20:00:    NTERPRETATION:  CLINICAL INFORMATION: Lower abdominal pain.    LMP: Status post  4 weeks ago.    COMPARISON: None available.    TECHNIQUE:  Endovaginal and transabdominal pelvic sonogram.    FINDINGS:  Uterus: 10.9 cm x 6.0 cm x 7.8 cm. Within normal limits.  Endometrium: 35 mm. heterogeneous echogenic material with areas of   shadowing noted distending the endometrial cavity with areas of   hypervascularity in the anterior fundal/body.    Right ovary: 2.6 cm x 1.3 cm x 1.5 cm. Within normal limits.  Left ovary: 2.7 cm x 1.3 cm x 1.3 cm. Within normal limits.    Fluid: None.    IMPRESSION:  Heterogeneously thickened echogenic endometrial cavity with hypervascular   area noted in the anterior fundus/body aspect, concerning for retained   products conception with surrounding hemorrhagic product        --- End of Report ---    Patient imaging with obvious signs of retained products of conception. Will add patient on for suction D&C. Procedure risks discussed including bleeding, infection and damage to nearby organs/perforation. All questions answered and patient expressed understanding. Consents signed. Discussed with attending Dr. Sibley    NS PGY2   A/P: 33y  POD32 from c/s for arrest of decent with infectious symptoms and new heavy VB   - Hemodynamically stable. H 12.3. Slow active bleeding from os. Continue to monitor  - R/o infection: patient remains afebrile with stable vitals and normal wbc. Low suspicion for post partum endometritis given no fundal tenderness. UA shows signs of UTI, UCx pending. Will reexamine patient to reevaluate for mastitis   - Vaginal bleeding: possible retained POCs, however would be unlikely this far post op. Awaiting TVUS. Discussed possibility of bleeding 2/2 to menses however patient endorses PCOS with amenorrhea x3 years. Will rediscuss after imaging returns.     D/w Dr. Toñito Weathers PA-C    Addendum  at 20:00:    NTERPRETATION:  CLINICAL INFORMATION: Lower abdominal pain.    LMP: Status post  4 weeks ago.    COMPARISON: None available.    TECHNIQUE:  Endovaginal and transabdominal pelvic sonogram.    FINDINGS:  Uterus: 10.9 cm x 6.0 cm x 7.8 cm. Within normal limits.  Endometrium: 35 mm. heterogeneous echogenic material with areas of   shadowing noted distending the endometrial cavity with areas of   hypervascularity in the anterior fundal/body.    Right ovary: 2.6 cm x 1.3 cm x 1.5 cm. Within normal limits.  Left ovary: 2.7 cm x 1.3 cm x 1.3 cm. Within normal limits.    Fluid: None.    IMPRESSION:  Heterogeneously thickened echogenic endometrial cavity with hypervascular   area noted in the anterior fundus/body aspect, concerning for retained   products conception with surrounding hemorrhagic product        --- End of Report ---    Patient imaging with obvious signs of retained products of conception. Please obtain type and screen. Will add patient on for suction D&C. Procedure risks discussed including bleeding, infection and damage to nearby organs/perforation. All questions answered and patient expressed understanding. Consents signed. Discussed with attending Dr. Toñito FRANK PGY2

## 2024-09-11 NOTE — ED PROVIDER NOTE - OBJECTIVE STATEMENT
34 yo F ,  on 8/10/2024, presenting with abdominal cramping, vaginal bleeding. Pt states several days prior has had fever, chills, myalgias. Went to [facility], was told she was pregnant, covid/flu neg. States now having vaginal bleeding with clots, 8 pads in last 4 hours. No lightheadedness/dizziness No urinary complaints. ROS as above.

## 2024-09-11 NOTE — ED PROVIDER NOTE - PHYSICAL EXAMINATION
general: Well appearing, in no acute distress  HEENT: Normocephalic, atraumatic, extraocular movements intact  CV: Regular rate  Pulm: No respiratory distress, no tachypnea  Abd: Flat, no gross distensio,  scar c/d/i, minimal lower abd ttp, no r/g  Ext: warm and well perfused  Skin: No gross rashes or lesions  Neuro: Alert and oriented, moving all extremities

## 2024-09-11 NOTE — ED PROVIDER NOTE - CLINICAL SUMMARY MEDICAL DECISION MAKING FREE TEXT BOX
34 yo with vaginal bleeding,fever, myalgias. will check labs, ua for ro infection, covid/flu, TVUS, reassess.

## 2024-09-11 NOTE — H&P ADULT - ASSESSMENT
33y  POD32 from c/s with retained products of conception  -will add on to OR for suction D&C  -please obtain type & screen   -consents signed  -all questions answered and patient expressed understanding  -discussed with attending Dr. Sibley

## 2024-09-11 NOTE — CONSULT NOTE ADULT - SUBJECTIVE AND OBJECTIVE BOX
33y  POD32 from c/s for arrest of decent presents for 3 days of chills/myalgias and 1 day of heavy vaginal bleeding. She reports chills, full body myalgias, neck stiffness, and fatigue x3 days. Denies fevers at home. States that prior to these symptoms she was feeling well post partum, with minimal bleeding. Dr. Sibley instructed the patient to go to the ED yesterday. She was seen at an OSH where she was told she was pregnancy based off a upreg test and told she had a UTI. Given distrust of the care she received, she did not take the antibiotics that were prescribed. She states that this morning she was experienced new onset heavy bleeding, passed a "pad sized clot"     OBHx: denies  Gyn H/x:  LMP:   H/x of cysts, fibroids, endometriosis: denies  STIs: denies h/x of HIV, RPR, Hepatitis, G/C, Trich  Contraception:   Last Pap:   GYN Physician:  PMH: denies  PSH: denies  Meds: none  NKDA       T(C): 36.8 (24 @ 16:51), Max: 36.8 (24 @ 16:51)  HR: 99 (24 @ 16:51) (99 - 99)  BP: 110/78 (24 @ 16:51) (110/78 - 110/78)  RR: 17 (24 @ 16:51) (17 - 17)  SpO2: 100% (24 @ 16:51) (100% - 100%)    PHYSICAL EXAM:   Gen: NAD  GI: soft, nontender, nondistended + BS, no rebound no guarding  : normal external genitalia   BME: normal anteverted uterus, no adnexal masses appreciated    Spec: cervix closed, no abnormal discharge/vaginal bleeding  Ext: wnl        LABS:                        12.3   4.62  )-----------( 140      ( 11 Sep 2024 17:26 )             36.9         137  |  104  |  15  ----------------------------<  99  3.9   |  22  |  0.85    Ca    9.0      11 Sep 2024 17:26        Urinalysis Basic - ( 11 Sep 2024 17:26 )    Color: Red / Appearance: Turbid / S.023 / pH: x  Gluc: 99 mg/dL / Ketone: Negative mg/dL  / Bili: Small / Urobili: 0.2 mg/dL   Blood: x / Protein: 100 mg/dL / Nitrite: Positive   Leuk Esterase: Large / RBC: >1900 /HPF /  /HPF   Sq Epi: x / Non Sq Epi: >36 /HPF / Bacteria: Few /HPF          RADIOLOGY & ADDITIONAL STUDIES:   33y  POD32 from c/s for arrest of decent presents for 3 days of chills/myalgias and 1 day of heavy vaginal bleeding. She reports chills, full body myalgias, neck stiffness, and fatigue x3 days. Denies fevers at home. States that prior to these symptoms she was feeling well post partum, with minimal bleeding. Dr. Sibley instructed the patient to go to the ED yesterday. She was seen at an OSH where she was told she was pregnancy based off a upreg test and told she had a UTI. Given distrust of the care she received, she did not take the antibiotics that were prescribed. She states that this morning she was experienced new onset heavy bleeding, passed a "pad sized clot" and has soaked 4 pads throughout the day. Patient is breastfeeding and denies intercourse since delivery. Denies dizziness, syncope, chest pain, congestion, cough, abd pain, dysuria.     OBHX:  G1 -  IOL at week 40, FW 3500g. Delivery c/b shoulder dystocia.   G2- 8/10/24 cs for arrest of decent   GynHX: +PCOS with infertility requiring IVF x2.  denies fibroids, ovarian cysts, abnormal pap smear, STI/herpes.   MedHX: denies  Surghx: denies  Medications: PNV  Allergies: NKDA      T(C): 36.8 (24 @ 16:51), Max: 36.8 (24 @ 16:51)  HR: 99 (24 @ 16:51) (99 - 99)  BP: 110/78 (24 @ 16:51) (110/78 - 110/78)  RR: 17 (24 @ 16:51) (17 - 17)  SpO2: 100% (24 @ 16:51) (100% - 100%)    PHYSICAL EXAM:   Gen: NAD, however shivering  GI: soft, nontender, nondistended + BS, no rebound no guarding. No fundal tenderness  : normal external genitalia  BME: normal anteverted uterus, no adnexal masses appreciated    Spec: cervix open 2 cm, ~30cc bright red blood in vault with slow active bleeding from os. Normal vagin mucous noted at os  Ext: wnl        LABS:                        12.3   4.62  )-----------( 140      ( 11 Sep 2024 17:26 )             36.9     09-11    137  |  104  |  15  ----------------------------<  99  3.9   |  22  |  0.85    Ca    9.0      11 Sep 2024 17:26        Urinalysis Basic - ( 11 Sep 2024 17:26 )    Color: Red / Appearance: Turbid / S.023 / pH: x  Gluc: 99 mg/dL / Ketone: Negative mg/dL  / Bili: Small / Urobili: 0.2 mg/dL   Blood: x / Protein: 100 mg/dL / Nitrite: Positive   Leuk Esterase: Large / RBC: >1900 /HPF /  /HPF   Sq Epi: x / Non Sq Epi: >36 /HPF / Bacteria: Few /HPF          RADIOLOGY & ADDITIONAL STUDIES:   33y  POD32 from c/s for arrest of decent presents for 3 days of chills/myalgias and 1 day of heavy vaginal bleeding. She reports chills, full body myalgias, neck stiffness, and fatigue x3 days. Denies fevers at home. States that prior to these symptoms she was feeling well post partum, with minimal bleeding. Dr. Sibley instructed the patient to go to the ED yesterday. She was seen at an OSH where she was told she was pregnancy based off a upreg test and told she had a UTI. Given distrust of the care she received, she did not take the antibiotics that were prescribed. She states that this morning she was experienced new onset heavy bleeding, passed a "pad sized clot" and has soaked 4 pads throughout the day. Patient is breastfeeding and denies intercourse since delivery. Denies dizziness, syncope, chest pain, congestion, cough, abd pain, dysuria.     OBHX:  G1 -  IOL at week 40, FW 3500g. Delivery c/b shoulder dystocia.   G2- 8/10/24 cs for arrest of decent   GynHX: +PCOS with infertility requiring IVF x2.  denies fibroids, ovarian cysts, abnormal pap smear, STI/herpes.   MedHX: denies  Surghx: denies  Medications: PNV  Allergies: NKDA      T(C): 36.8 (24 @ 16:51), Max: 36.8 (24 @ 16:51)  HR: 99 (24 @ 16:51) (99 - 99)  BP: 110/78 (24 @ 16:51) (110/78 - 110/78)  RR: 17 (24 @ 16:51) (17 - 17)  SpO2: 100% (24 @ 16:51) (100% - 100%)    PHYSICAL EXAM:   Gen: NAD, however shivering  GI: soft, nontender, nondistended + BS, no rebound no guarding. No fundal tenderness  : normal external genitalia, no CVA tenderness  BME: normal anteverted uterus, no adnexal masses appreciated    Spec: cervix open 2 cm, ~30cc bright red blood in vault with slow active bleeding from os. Normal cervical mucous noted at os  Ext: wnl        LABS:                        12.3   4.62  )-----------( 140      ( 11 Sep 2024 17:26 )             36.9     09-11    137  |  104  |  15  ----------------------------<  99  3.9   |  22  |  0.85    Ca    9.0      11 Sep 2024 17:26    [facility] [place] [facility]     Urinalysis Basic - ( 11 Sep 2024 17:26 )    Color: Red / Appearance: Turbid / S.023 / pH: x  Gluc: 99 mg/dL / Ketone: Negative mg/dL  / Bili: Small / Urobili: 0.2 mg/dL   Blood: x / Protein: 100 mg/dL / Nitrite: Positive   Leuk Esterase: Large / RBC: >1900 /HPF /  /HPF   Sq Epi: x / Non Sq Epi: >36 /HPF / Bacteria: Few /HPF          RADIOLOGY & ADDITIONAL STUDIES:  US pending  33y  POD32 from c/s for arrest of decent presents for 3 days of chills/myalgias and 1 day of heavy vaginal bleeding. She reports chills, full body myalgias, neck stiffness, and fatigue x3 days. Denies fevers at home. States that prior to these symptoms she was feeling well post partum, with minimal bleeding. Dr. Sibley instructed the patient to go to the ED yesterday. She was seen at an OSH where she was told she was pregnancy based off a upreg test and told she had a UTI. Given distrust of the care she received, she did not take the antibiotics that were prescribed. She states that this morning she was experienced new onset heavy bleeding, passed a "pad sized clot" and has soaked 4 pads throughout the day. Patient is breastfeeding and denies intercourse since delivery. Denies dizziness, syncope, chest pain, congestion, cough, abd pain, dysuria.     OBHX:  G1 -  IOL at week 40, FW 3500g. Delivery c/b shoulder dystocia.   G2- 8/10/24 cs for arrest of decent   GynHX: +PCOS with infertility requiring IVF x2.  denies fibroids, ovarian cysts, abnormal pap smear, STI/herpes.   MedHX: denies  Surghx: denies  Medications: PNV  Allergies: NKDA      T(C): 36.8 (24 @ 16:51), Max: 36.8 (24 @ 16:51)  HR: 99 (24 @ 16:51) (99 - 99)  BP: 110/78 (24 @ 16:51) (110/78 - 110/78)  RR: 17 (24 @ 16:51) (17 - 17)  SpO2: 100% (24 @ 16:51) (100% - 100%)    PHYSICAL EXAM:   Gen: NAD, however shivering  GI: soft, nontender, nondistended + BS, no rebound no guarding. No fundal tenderness  : normal external genitalia, no CVA tenderness  BME: normal anteverted uterus, no adnexal masses appreciated    Spec: cervix open 2 cm, ~30cc bright red blood in vault with slow active bleeding from os. Normal cervical mucous noted at os  Ext: wnl        LABS:                        12.3   4.62  )-----------( 140      ( 11 Sep 2024 17:26 )             36.9     09-11    137  |  104  |  15  ----------------------------<  99  3.9   |  22  |  0.85    Ca    9.0      11 Sep 2024 17:26    [facility] [place]     Urinalysis Basic - ( 11 Sep 2024 17:26 )    Color: Red / Appearance: Turbid / S.023 / pH: x  Gluc: 99 mg/dL / Ketone: Negative mg/dL  / Bili: Small / Urobili: 0.2 mg/dL   Blood: x / Protein: 100 mg/dL / Nitrite: Positive   Leuk Esterase: Large / RBC: >1900 /HPF /  /HPF   Sq Epi: x / Non Sq Epi: >36 /HPF / Bacteria: Few /HPF          RADIOLOGY & ADDITIONAL STUDIES:  US pending

## 2024-09-11 NOTE — H&P ADULT - HISTORY OF PRESENT ILLNESS
33y  POD32 from c/s for arrest of decent presents for 3 days of chills/myalgias and 1 day of heavy vaginal bleeding. She reports chills, full body myalgias, neck stiffness, and fatigue x3 days. Denies fevers at home. States that prior to these symptoms she was feeling well post partum, with minimal bleeding. Dr. Sibley instructed the patient to go to the ED yesterday. She was seen at an OSH where she was told she was pregnancy based off a upreg test and told she had a UTI. Given distrust of the care she received, she did not take the antibiotics that were prescribed. She states that this morning she was experienced new onset heavy bleeding, passed a "pad sized clot" and has soaked 4 pads throughout the day. Patient is breastfeeding and denies intercourse since delivery. Denies dizziness, syncope, chest pain, congestion, cough, abd pain, dysuria.     OBHX:  G1 -  IOL at week 40, FW 3500g. Delivery c/b shoulder dystocia.   G2- 8/10/24 cs for arrest of decent   GynHX: +PCOS with infertility requiring IVF x2.  denies fibroids, ovarian cysts, abnormal pap smear, STI/herpes.   MedHX: denies  Surghx: denies  Medications: PNV  Allergies: NKDA      T(C): 36.8 (24 @ 16:51), Max: 36.8 (24 @ 16:51)  HR: 99 (24 @ 16:51) (99 - 99)  BP: 110/78 (24 @ 16:51) (110/78 - 110/78)  RR: 17 (24 @ 16:51) (17 - 17)  SpO2: 100% (24 @ 16:51) (100% - 100%)    PHYSICAL EXAM:   Gen: NAD, however shivering  GI: soft, nontender, nondistended + BS, no rebound no guarding. No fundal tenderness  : normal external genitalia, no CVA tenderness  BME: normal anteverted uterus, no adnexal masses appreciated    Spec: cervix open 2 cm, ~30cc bright red blood in vault with slow active bleeding from os. Normal cervical mucous noted at os  Ext: wnl        LABS:                        12.3   4.62  )-----------( 140      ( 11 Sep 2024 17:26 )             36.9     09-11    137  |  104  |  15  ----------------------------<  99  3.9   |  22  |  0.85    Ca    9.0      11 Sep 2024 17:26    c 26     Urinalysis Basic - ( 11 Sep 2024 17:26 )    Color: Red / Appearance: Turbid / S.023 / pH: x  Gluc: 99 mg/dL / Ketone: Negative mg/dL  / Bili: Small / Urobili: 0.2 mg/dL   Blood: x / Protein: 100 mg/dL / Nitrite: Positive   Leuk Esterase: Large / RBC: >1900 /HPF /  /HPF   Sq Epi: x / Non Sq Epi: >36 /HPF / Bacteria: Few /HPF          RADIOLOGY & ADDITIONAL STUDIES:  NTERPRETATION:  CLINICAL INFORMATION: Lower abdominal pain.    LMP: Status post  4 weeks ago.    COMPARISON: None available.    TECHNIQUE:  Endovaginal and transabdominal pelvic sonogram.    FINDINGS:  Uterus: 10.9 cm x 6.0 cm x 7.8 cm. Within normal limits.  Endometrium: 35 mm. heterogeneous echogenic material with areas of   shadowing noted distending the endometrial cavity with areas of   hypervascularity in the anterior fundal/body.    Right ovary: 2.6 cm x 1.3 cm x 1.5 cm. Within normal limits.  Left ovary: 2.7 cm x 1.3 cm x 1.3 cm. Within normal limits.    Fluid: None.    IMPRESSION:  Heterogeneously thickened echogenic endometrial cavity with hypervascular   area noted in the anterior fundus/body aspect, concerning for retained   products conception with surrounding hemorrhagic product

## 2024-09-11 NOTE — ED PROVIDER NOTE - CARE PLAN
1 Principal Discharge DX:	Vaginal bleeding   Principal Discharge DX:	Retained products of conception, postpartum

## 2024-09-11 NOTE — ED ADULT TRIAGE NOTE - CHIEF COMPLAINT QUOTE
Pt is  postpartum, C. section on 8/10/24 at 38weeks gestation reporting, "I started having chills on Saturday with headache. Today I started having stomach cramping and vaginal bleeding." Reports using 8 pads since this morning. Denies nausea/vomiting, urinary symptoms, blood thinner use, shortness of breath.

## 2024-09-12 LAB
CULTURE RESULTS: SIGNIFICANT CHANGE UP
HCT VFR BLD CALC: 29.8 % — LOW (ref 34.5–45)
HGB BLD-MCNC: 10 G/DL — LOW (ref 11.5–15.5)
MCHC RBC-ENTMCNC: 31.9 PG — SIGNIFICANT CHANGE UP (ref 27–34)
MCHC RBC-ENTMCNC: 33.6 GM/DL — SIGNIFICANT CHANGE UP (ref 32–36)
MCV RBC AUTO: 95.2 FL — SIGNIFICANT CHANGE UP (ref 80–100)
NRBC # BLD: 0 /100 WBCS — SIGNIFICANT CHANGE UP (ref 0–0)
PLATELET # BLD AUTO: 131 K/UL — LOW (ref 150–400)
RBC # BLD: 3.13 M/UL — LOW (ref 3.8–5.2)
RBC # FLD: 13.4 % — SIGNIFICANT CHANGE UP (ref 10.3–14.5)
SPECIMEN SOURCE: SIGNIFICANT CHANGE UP
WBC # BLD: 5.48 K/UL — SIGNIFICANT CHANGE UP (ref 3.8–10.5)
WBC # FLD AUTO: 5.48 K/UL — SIGNIFICANT CHANGE UP (ref 3.8–10.5)

## 2024-09-12 PROCEDURE — 86850 RBC ANTIBODY SCREEN: CPT

## 2024-09-12 PROCEDURE — 76856 US EXAM PELVIC COMPLETE: CPT

## 2024-09-12 PROCEDURE — 88305 TISSUE EXAM BY PATHOLOGIST: CPT | Mod: 26

## 2024-09-12 PROCEDURE — 86900 BLOOD TYPING SEROLOGIC ABO: CPT

## 2024-09-12 PROCEDURE — 83690 ASSAY OF LIPASE: CPT

## 2024-09-12 PROCEDURE — 36415 COLL VENOUS BLD VENIPUNCTURE: CPT

## 2024-09-12 PROCEDURE — 86901 BLOOD TYPING SEROLOGIC RH(D): CPT

## 2024-09-12 PROCEDURE — 87637 SARSCOV2&INF A&B&RSV AMP PRB: CPT

## 2024-09-12 PROCEDURE — 85730 THROMBOPLASTIN TIME PARTIAL: CPT

## 2024-09-12 PROCEDURE — 76830 TRANSVAGINAL US NON-OB: CPT

## 2024-09-12 PROCEDURE — 84702 CHORIONIC GONADOTROPIN TEST: CPT

## 2024-09-12 PROCEDURE — 85025 COMPLETE CBC W/AUTO DIFF WBC: CPT

## 2024-09-12 PROCEDURE — 80076 HEPATIC FUNCTION PANEL: CPT

## 2024-09-12 PROCEDURE — 85027 COMPLETE CBC AUTOMATED: CPT

## 2024-09-12 PROCEDURE — 87086 URINE CULTURE/COLONY COUNT: CPT

## 2024-09-12 PROCEDURE — 96374 THER/PROPH/DIAG INJ IV PUSH: CPT

## 2024-09-12 PROCEDURE — 80048 BASIC METABOLIC PNL TOTAL CA: CPT

## 2024-09-12 PROCEDURE — 88305 TISSUE EXAM BY PATHOLOGIST: CPT

## 2024-09-12 PROCEDURE — 99285 EMERGENCY DEPT VISIT HI MDM: CPT

## 2024-09-12 PROCEDURE — 81001 URINALYSIS AUTO W/SCOPE: CPT

## 2024-09-12 PROCEDURE — 85610 PROTHROMBIN TIME: CPT

## 2024-09-12 RX ORDER — CLINDAMYCIN PHOSPHATE 150 MG/ML
900 VIAL (ML) INJECTION EVERY 8 HOURS
Refills: 0 | Status: DISCONTINUED | OUTPATIENT
Start: 2024-09-12 | End: 2024-09-12

## 2024-09-12 RX ORDER — OXYCODONE HYDROCHLORIDE 5 MG/1
5 TABLET ORAL EVERY 4 HOURS
Refills: 0 | Status: DISCONTINUED | OUTPATIENT
Start: 2024-09-12 | End: 2024-09-13

## 2024-09-12 RX ORDER — CLINDAMYCIN PHOSPHATE 150 MG/ML
900 VIAL (ML) INJECTION EVERY 8 HOURS
Refills: 0 | Status: COMPLETED | OUTPATIENT
Start: 2024-09-12 | End: 2024-09-12

## 2024-09-12 RX ORDER — OXYCODONE HYDROCHLORIDE 5 MG/1
10 TABLET ORAL EVERY 4 HOURS
Refills: 0 | Status: DISCONTINUED | OUTPATIENT
Start: 2024-09-12 | End: 2024-09-13

## 2024-09-12 RX ORDER — PANTOPRAZOLE SODIUM 40 MG
20 TABLET, DELAYED RELEASE (ENTERIC COATED) ORAL DAILY
Refills: 0 | Status: DISCONTINUED | OUTPATIENT
Start: 2024-09-12 | End: 2024-09-13

## 2024-09-12 RX ORDER — ACETAMINOPHEN 325 MG/1
1000 TABLET ORAL EVERY 6 HOURS
Refills: 0 | Status: COMPLETED | OUTPATIENT
Start: 2024-09-12 | End: 2024-09-13

## 2024-09-12 RX ORDER — METOCLOPRAMIDE HCL 5 MG
10 TABLET ORAL EVERY 6 HOURS
Refills: 0 | Status: DISCONTINUED | OUTPATIENT
Start: 2024-09-12 | End: 2024-09-13

## 2024-09-12 RX ORDER — KETOROLAC TROMETHAMINE 30 MG/ML
30 INJECTION, SOLUTION INTRAMUSCULAR EVERY 6 HOURS
Refills: 0 | Status: DISCONTINUED | OUTPATIENT
Start: 2024-09-12 | End: 2024-09-12

## 2024-09-12 RX ORDER — HYDROMORPHONE HYDROCHLORIDE 2 MG/1
0.5 TABLET ORAL
Refills: 0 | Status: DISCONTINUED | OUTPATIENT
Start: 2024-09-12 | End: 2024-09-13

## 2024-09-12 RX ORDER — ONDANSETRON 2 MG/ML
8 INJECTION, SOLUTION INTRAMUSCULAR; INTRAVENOUS EVERY 6 HOURS
Refills: 0 | Status: DISCONTINUED | OUTPATIENT
Start: 2024-09-12 | End: 2024-09-13

## 2024-09-12 RX ADMIN — ACETAMINOPHEN 1000 MILLIGRAM(S): 325 TABLET ORAL at 17:48

## 2024-09-12 RX ADMIN — ACETAMINOPHEN 1000 MILLIGRAM(S): 325 TABLET ORAL at 06:20

## 2024-09-12 RX ADMIN — KETOROLAC TROMETHAMINE 30 MILLIGRAM(S): 30 INJECTION, SOLUTION INTRAMUSCULAR at 09:06

## 2024-09-12 RX ADMIN — Medication 100 MILLIGRAM(S): at 15:50

## 2024-09-12 RX ADMIN — KETOROLAC TROMETHAMINE 30 MILLIGRAM(S): 30 INJECTION, SOLUTION INTRAMUSCULAR at 14:35

## 2024-09-12 RX ADMIN — Medication 2000 MILLILITER(S): at 01:57

## 2024-09-12 RX ADMIN — KETOROLAC TROMETHAMINE 30 MILLIGRAM(S): 30 INJECTION, SOLUTION INTRAMUSCULAR at 21:25

## 2024-09-12 RX ADMIN — Medication 100 MILLIGRAM(S): at 07:55

## 2024-09-12 RX ADMIN — ACETAMINOPHEN 1000 MILLIGRAM(S): 325 TABLET ORAL at 11:40

## 2024-09-12 NOTE — BRIEF OPERATIVE NOTE - NSICDXBRIEFPROCEDURE_GEN_ALL_CORE_FT
PROCEDURES:  Dilation and curettage, uterus, using suction, for retained placenta 12-Sep-2024 01:27:57  Erna Ahmadi

## 2024-09-12 NOTE — BRIEF OPERATIVE NOTE - OPERATION/FINDINGS
The patient was prepped and draped in the normal sterile fashion in the dorsal lithotomy position. The bladder was drained. A weighted speculum was placed in the posterior vagina, and a retractor was placed in the anterior vagina. The anterior lip of the cervix was grasped with a single-tooth tenaculum. The cervix was dilated from retained products. A #10 suction curette was advanced gently to the uterine fundus, the suction device was then activated and the curette rotated to clear the uterus. A sharp curettage followed until a gritty texture was noted. Patient tolerated procedure well.

## 2024-09-13 ENCOUNTER — TRANSCRIPTION ENCOUNTER (OUTPATIENT)
Age: 34
End: 2024-09-13

## 2024-09-13 VITALS
TEMPERATURE: 98 F | RESPIRATION RATE: 18 BRPM | SYSTOLIC BLOOD PRESSURE: 112 MMHG | DIASTOLIC BLOOD PRESSURE: 75 MMHG | OXYGEN SATURATION: 97 % | HEART RATE: 62 BPM

## 2024-09-13 LAB
CULTURE RESULTS: SIGNIFICANT CHANGE UP
SPECIMEN SOURCE: SIGNIFICANT CHANGE UP

## 2024-09-13 NOTE — DISCHARGE NOTE PROVIDER - HOSPITAL COURSE
Patient presented on postpartum day 32 (POD32 from CS for arrest of dilation) with vaginal bleeding and elevated temp. She had TVUS and she was found to have retained POC and endometritis. She had suction D&C, she received gentamycin and clindamycin for 24 hours, and was discharged after being afebrile for 24 hours. At time of discharge the patient is stable and has no signs/symptoms of an active infection.

## 2024-09-13 NOTE — DISCHARGE NOTE PROVIDER - CARE PROVIDER_API CALL
Judy Sibley  Obstetrics and Gynecology  [address] [address]  Phone: [phone]  Fax: [phone]  Follow Up Time:

## 2024-09-13 NOTE — DISCHARGE NOTE PROVIDER - NSDCCPCAREPLAN_GEN_ALL_CORE_FT
PRINCIPAL DISCHARGE DIAGNOSIS  Diagnosis: Retained products of conception, postpartum  Assessment and Plan of Treatment:       SECONDARY DISCHARGE DIAGNOSES  Diagnosis: Endometritis  Assessment and Plan of Treatment:

## 2024-09-13 NOTE — PROGRESS NOTE ADULT - SUBJECTIVE AND OBJECTIVE BOX
Pt seen and examined at bedside. Pt denies abdominal pain.  Pt denies any fever, chills, chest pain, SOB, nausea or vomiting     T(F): 97.6 (09-12-24 @ 02:45), Max: 100.5 (09-11-24 @ 22:18)  HR: 75 (09-12-24 @ 02:45) (57 - 103)  BP: 93/60 (09-12-24 @ 02:45) (78/52 - 110/78)  RR: 20 (09-12-24 @ 02:45) (16 - 21)  SpO2: 95% (09-12-24 @ 02:45) (95% - 100%)  Wt(kg): --    09-11 @ 07:01  -  09-12 @ 05:13  --------------------------------------------------------  IN: 1500 mL / OUT: 1100 mL / NET: 400 mL        acetaminophen     Tablet .. 1000 milliGRAM(s) Oral every 6 hours  clindamycin IVPB 900 milliGRAM(s) IV Intermittent every 8 hours  HYDROmorphone  Injectable 0.5 milliGRAM(s) IV Push every 15 minutes PRN Severe Pain (7 - 10)  ketorolac   Injectable 30 milliGRAM(s) IV Push every 6 hours  lactated ringers. 1000 milliLiter(s) IV Continuous <Continuous>  metoclopramide Injectable 10 milliGRAM(s) IV Push every 6 hours PRN Nausea and/or Vomiting  ondansetron Injectable 8 milliGRAM(s) IV Push every 6 hours PRN Nausea and/or Vomiting  oxyCODONE    IR 5 milliGRAM(s) Oral every 4 hours PRN Moderate Pain (4 - 6)  oxyCODONE    IR 10 milliGRAM(s) Oral every 4 hours PRN Severe Pain (7 - 10)  pantoprazole  Injectable 20 milliGRAM(s) IV Push daily  simethicone 80 milliGRAM(s) Chew every 6 hours PRN Gas      Physical exam:  Constitutional: NAD  Abdomen: soft, non-tender, nondistended  Extremities: no lower extremity edema, or calve tenderness. SCDs in place    A:   33y, s/p suction D&C for rPOC. Uncomplicated.   Post operative check performed.    Plan:  1. Vital signs stable, continue to monitor per protocol  2. Pain control Tylenol/Toradol, Oxycodone PRN for BTP.  3. DVT prophylaxis: SCDs  4. CV: IVH   5. Pulm: Incentive spirometer (at least 10 times per hour while awake)   6. GI:  regular diet   7. : voiding  8. Activity: ambulate as tolerated  9: ID: continue gent/clinda for 24 hours
Pt seen and examined at bedside. Pt states that she no longer has fever chills or vaginal bleeding. She is feeling well.    T(F): 97.5 (09-12-24 @ 22:23), Max: 98.2 (09-12-24 @ 10:00)  HR: 55 (09-12-24 @ 22:23) (55 - 63)  BP: 108/73 (09-12-24 @ 22:23) (88/50 - 108/73)  RR: 18 (09-12-24 @ 22:23) (16 - 18)  SpO2: 98% (09-12-24 @ 22:23) (95% - 98%)  Wt(kg): --    09-11 @ 07:01  -  09-12 @ 07:00  --------------------------------------------------------  IN: 1500 mL / OUT: 1100 mL / NET: 400 mL        HYDROmorphone  Injectable 0.5 milliGRAM(s) IV Push every 15 minutes PRN Severe Pain (7 - 10)  metoclopramide Injectable 10 milliGRAM(s) IV Push every 6 hours PRN Nausea and/or Vomiting  ondansetron Injectable 8 milliGRAM(s) IV Push every 6 hours PRN Nausea and/or Vomiting  oxyCODONE    IR 10 milliGRAM(s) Oral every 4 hours PRN Severe Pain (7 - 10)  oxyCODONE    IR 5 milliGRAM(s) Oral every 4 hours PRN Moderate Pain (4 - 6)  pantoprazole  Injectable 20 milliGRAM(s) IV Push daily  simethicone 80 milliGRAM(s) Chew every 6 hours PRN Gas      Physical exam:  Constitutional: NAD  Abdomen: c/s incision clean, dry and intact. Soft, non-tender, nondistended  Extremities: no lower extremity edema

## 2024-09-13 NOTE — DISCHARGE NOTE NURSING/CASE MANAGEMENT/SOCIAL WORK - PATIENT PORTAL LINK FT
You can access the FollowMyHealth Patient Portal offered by [facility] by registering at the following website: [email or web address]. By joining [facility]’s FollowMyHealth portal, you will also be able to view your health information using other applications (apps) compatible with our system.

## 2024-09-13 NOTE — PROGRESS NOTE ADULT - ASSESSMENT
33y POD34/POD1 from suction D&C for rPOC, feeling well    Plan:  1. Vital signs stable, continue to monitor per protocol  2. s/p Gent/clinda x 24 hours, afebrile   3. plan to discharge home today

## 2024-09-13 NOTE — DISCHARGE NOTE NURSING/CASE MANAGEMENT/SOCIAL WORK - NSDCPEFALRISK_GEN_ALL_CORE
For information on Fall & Injury Prevention, visit: [email or web address] OR  [email or web address] OR  https://www.cdc.gov/steadi/patient.html

## 2024-09-18 LAB — SURGICAL PATHOLOGY STUDY: SIGNIFICANT CHANGE UP

## 2024-09-19 DIAGNOSIS — E28.2 POLYCYSTIC OVARIAN SYNDROME: ICD-10-CM

## (undated) DEVICE — GLV 7 PROTEXIS (WHITE)

## (undated) DEVICE — WARMING BLANKET UPPER ADULT

## (undated) DEVICE — VENODYNE/SCD SLEEVE CALF MEDIUM

## (undated) DEVICE — POSITIONER FOAM EGG CRATE ULNAR 2PCS (PINK)

## (undated) DEVICE — PACK D&C